# Patient Record
Sex: MALE | Race: WHITE | NOT HISPANIC OR LATINO
[De-identification: names, ages, dates, MRNs, and addresses within clinical notes are randomized per-mention and may not be internally consistent; named-entity substitution may affect disease eponyms.]

---

## 2022-12-01 ENCOUNTER — APPOINTMENT (OUTPATIENT)
Dept: ORTHOPEDIC SURGERY | Facility: CLINIC | Age: 57
End: 2022-12-01

## 2022-12-01 VITALS — BODY MASS INDEX: 32.47 KG/M2 | WEIGHT: 253 LBS | HEIGHT: 74 IN

## 2022-12-01 PROCEDURE — 73030 X-RAY EXAM OF SHOULDER: CPT | Mod: RT

## 2022-12-01 PROCEDURE — 72040 X-RAY EXAM NECK SPINE 2-3 VW: CPT

## 2022-12-01 PROCEDURE — 99213 OFFICE O/P EST LOW 20 MIN: CPT

## 2022-12-01 PROCEDURE — 73010 X-RAY EXAM OF SHOULDER BLADE: CPT | Mod: RT

## 2022-12-01 NOTE — IMAGING
[Facet arthropathy] : Facet arthropathy [Disc space narrowing] : Disc space narrowing [Right] : right shoulder [There are no fractures, subluxations or dislocations. No significant abnormalities are seen] : There are no fractures, subluxations or dislocations. No significant abnormalities are seen [de-identified] : RIGHT SHOULDER\par No swelling, no ecchymosis, no deformity, no scapular winging.\par TTP BRACHIALIS\par Forward flexion to 180; external rotation to 90 degrees (with shoulder abducted); internal rotation to 70 degrees (with shoulder abducted)\par 5/5 supraspinatus, infraspinatus and subscapularis\par Negative Vuong test, negative impingement sign, negative O’Ronald test.\par Speed’s and yergason negative\par Motor and sensory intact distally\par \par CERVICAL\par No swelling, no ecchymosis\par No trapezial or paracervical tenderness to palpation\par Full range of motion with no pain\par 5/5 deltoid, biceps, triceps and wrist flexors/extensors\par Negative spurling, negative retana\par Reflexes +2, intact motor and sensory distally\par

## 2022-12-01 NOTE — HISTORY OF PRESENT ILLNESS
[4] : 4 [Sharp] : sharp [Tingling] : tingling [Constant] : constant [de-identified] : 12/01/2022:  Mr. Sanderson IS A 57 year YEAR OLD male PRESENTS TODAY FOR RIGHT ARM. PAIN FOR THE PAST FEW WEEKS WITHOUT INJURY. FEELS PAIN UNDER THE ARM AS WELL AS NUMBNESS INTO THE WHOLE HAND. [] : no [FreeTextEntry1] : right arm [FreeTextEntry5] : no reported injury, pt started feeling pain in the arm about a month ago. He states he has been experiencing numbness in the fingers  [FreeTextEntry6] : numbness  [FreeTextEntry7] : into the fingers

## 2022-12-01 NOTE — ASSESSMENT
[FreeTextEntry1] : ATRAUMATIC RIGHT ARM PAIN WITH RADICULAR SYMPTOMS\par SHOULDER XRAYS NEGATIVE, CERVICAL XRAYS WITH MULTILEVEL DDD MOST NOTED AT C4-5 AND C5-6\par MDP RX\par BEGIN PT\par REFER TO SPINE IF SYMPTOMS PERSIST

## 2022-12-08 ENCOUNTER — APPOINTMENT (OUTPATIENT)
Dept: ORTHOPEDIC SURGERY | Facility: CLINIC | Age: 57
End: 2022-12-08

## 2022-12-08 VITALS — HEIGHT: 74 IN | BODY MASS INDEX: 32.34 KG/M2 | WEIGHT: 252 LBS

## 2022-12-08 DIAGNOSIS — I51.9 HEART DISEASE, UNSPECIFIED: ICD-10-CM

## 2022-12-08 PROCEDURE — 99213 OFFICE O/P EST LOW 20 MIN: CPT

## 2022-12-08 NOTE — HISTORY OF PRESENT ILLNESS
[Right Arm] : right arm [3] : 3 [Radiating] : radiating [Sharp] : sharp [Tingling] : tingling [Constant] : constant [Household chores] : household chores [Leisure] : leisure [Work] : work [Full time] : Work status: full time [de-identified] : 57 year old male with RUE radiculopathy.  Pain radiates to upper arm. Tingling digits 2-5.  No numbness, weakness or tingling.  No inctiing injury or event.  Never done PT.  NHad history JANET in lower back.  Tried  MDP finished yesterday with no change in symptoms.  .  NO JANET in neck.  No issues with balance, fine motor movement , or dexterity.\par \par \par PMH: None\par PSH: R distal biceps repair., L shoulder labrum repair.,  L knee meniscus repair\par All:NKDA [] : no [FreeTextEntry1] : right hand [FreeTextEntry5] : pt states he has right arm and right hand pain. Referred by Dr. Chow, was told the pain is coming from the cervical spine. no injury/ fall. pain has been for about 2-3 months.  [FreeTextEntry6] : numbness [FreeTextEntry9] : aleve [de-identified] : xray [de-identified] : [employer]

## 2022-12-08 NOTE — ASSESSMENT
[FreeTextEntry1] : 57 year old  [place] with RUE radiculopathy.  Failed MDP, and aleve. \par MRI C spine\par Naprosyn\par PT\par FU after MRI

## 2022-12-08 NOTE — IMAGING
[de-identified] : Spine:\par Inspection/Palpation:\par No tenderness to palpation throughout Cervical/thoracic/lumbar spine.\par No bony stepoffs, No lesions.\par \par Gait:\par Non-antalgic, able to perform bilateral toe and heel rise.  Able to perform tandem gait.  \par \par Range of Motion:\par Cervical Spine: Flexion to chin to chest, extension to 70 degrees,  rotation 90 degrees bilaterally, Lateral flexion to 45 degrees bilaterally\par \par \par Neurologic:\par Bilateral upper extremities 5/5 Deltoid/Biceps/Triceps/ Wrist Flexion/Wrist Extension/ / Intrinsics Except\par \par Sensation intact to light touch C5-T1\par \par Biceps/Triceps/Brachioradialis Reflex within normal limits\par \par Negative Cox's,  No inverted brachioradials reflex\par \par \par \par  X-ray Cervical spine with multilevel degenerative changes.  C3-4 spondylolisthesis.

## 2022-12-14 ENCOUNTER — FORM ENCOUNTER (OUTPATIENT)
Age: 57
End: 2022-12-14

## 2022-12-15 ENCOUNTER — APPOINTMENT (OUTPATIENT)
Dept: MRI IMAGING | Facility: CLINIC | Age: 57
End: 2022-12-15
Payer: COMMERCIAL

## 2022-12-15 PROCEDURE — 72141 MRI NECK SPINE W/O DYE: CPT

## 2022-12-22 ENCOUNTER — APPOINTMENT (OUTPATIENT)
Dept: ORTHOPEDIC SURGERY | Facility: CLINIC | Age: 57
End: 2022-12-22
Payer: COMMERCIAL

## 2022-12-22 VITALS — WEIGHT: 252 LBS | HEIGHT: 74 IN | BODY MASS INDEX: 32.34 KG/M2

## 2022-12-22 PROCEDURE — 99213 OFFICE O/P EST LOW 20 MIN: CPT

## 2022-12-22 NOTE — HISTORY OF PRESENT ILLNESS
[Right Arm] : right arm [3] : 3 [Radiating] : radiating [Sharp] : sharp [Tingling] : tingling [Constant] : constant [Household chores] : household chores [Leisure] : leisure [Work] : work [Full time] : Work status: full time [de-identified] : 57 year old male with RUE radiculopathy.  Pain radiates to upper arm. Tingling digits 2-5.  No numbness, weakness or tingling.  No inctiing injury or event.  Never done PT.  NHad history JANET in lower back.  Tried  MDP finished yesterday with no change in symptoms.  .  NO JANET in neck.  No issues with balance, fine motor movement , or dexterity.\par \par \par PMH: None\par PSH: R distal biceps repair., L shoulder labrum repair.,  L knee meniscus repair\par All:NKDA\par \par 12/22/22: Pt is here for MRI results, he reports the pain in his arm is subsiding, but the numbness and tingling in his hand is the same.  [] : no [FreeTextEntry1] : right hand [FreeTextEntry5] : pt states he has right arm and right hand pain. Referred by Dr. Chow, was told the pain is coming from the cervical spine. no injury/ fall. pain has been for about 2-3 months.  [FreeTextEntry6] : numbness [FreeTextEntry9] : aleve [de-identified] : xray [de-identified] : [employer]

## 2022-12-22 NOTE — ASSESSMENT
[FreeTextEntry1] : 57 year old  male with RUE radiculopathy.  Failed MDP, and aleve. \par MIld improvement in arm pain but with persistent tingling in C7 and C8 distribution\par Pain management referral for JANET\par FU after injections\par if injections fail discussed possible posterior cervical decompression and fusion

## 2022-12-22 NOTE — IMAGING
[de-identified] : Spine:\par Inspection/Palpation:\par No tenderness to palpation throughout Cervical/thoracic/lumbar spine.\par No bony stepoffs, No lesions.\par \par Gait:\par Non-antalgic, able to perform bilateral toe and heel rise.  Able to perform tandem gait.  \par \par Range of Motion:\par Cervical Spine: Flexion to chin to chest, extension to 70 degrees,  rotation 90 degrees bilaterally, Lateral flexion to 45 degrees bilaterally\par \par \par Neurologic:\par Bilateral upper extremities 5/5 Deltoid/Biceps/Triceps/ Wrist Flexion/Wrist Extension/ / Intrinsics Except\par \par Sensation intact to light touch C5-T1\par \par Biceps/Triceps/Brachioradialis Reflex within normal limits\par \par Negative Cox's,  No inverted brachioradials reflex\par \par \par \par  X-ray Cervical spine with multilevel degenerative changes.  C3-4 spondylolisthesis\par \par MRI C spine reviewed and interpreted independently,\par Multilevel degenerative changes with foraminal stenosis No fever, rash, or recent illness.  No joint pain/swelling/stiffness.  No eye pain/redness/change in vision.  No sores in the mouth or nose.  No difficulty swallowing.  No chest pain or shortness of breath.  No abdominal complaints or weight loss.  No weakness.  No headaches or focal neurological deficits.  No urinary changes.  No other new symptoms. spinal cord compression\par Suboptimal study secondary to patient motion.\par Reversal of cervical lordosis with minimal degenerative anterolisthesis at C3-4.\par Multilevel cervical degenerative disc disease most pronounced at C5-6.\par Disc herniations and/or osteophytes from C2-3 through C6-7 with spinal cord and/or nerve root compression as \par detailed above.\par The spinal cord is suboptimally evaluated because of the patient motion. As best as can be determined the spinal \par cord has a normal signal..

## 2022-12-29 ENCOUNTER — APPOINTMENT (OUTPATIENT)
Dept: PAIN MANAGEMENT | Facility: CLINIC | Age: 57
End: 2022-12-29

## 2022-12-29 VITALS — HEIGHT: 74 IN | WEIGHT: 252 LBS | BODY MASS INDEX: 32.34 KG/M2

## 2022-12-29 DIAGNOSIS — M54.12 RADICULOPATHY, CERVICAL REGION: ICD-10-CM

## 2022-12-29 DIAGNOSIS — M54.2 CERVICALGIA: ICD-10-CM

## 2022-12-29 PROCEDURE — 99204 OFFICE O/P NEW MOD 45 MIN: CPT

## 2022-12-29 NOTE — PHYSICAL EXAM
[de-identified] : Constitutional; Appears well, no apparent distress\par Ability to communicate: Normal \par Respiratory: non-labored breathing\par Skin: No rash noted\par Head: Normocephalic, atraumatic\par Neck: no visible thyroid enlargement\par Eyes: Extraocular movements intact\par Neurologic: Alert and oriented x3\par Psychiatric: normal mood, affect and behavior \par \par  [] : positive Spurling

## 2022-12-29 NOTE — DISCUSSION/SUMMARY
[de-identified] : After discussing various treatment options with the patient including but not limited to oral medications, physical therapy, exercise modalities as well as interventional spinal injections, we have decided with the following plan:\par \par - Continue Home exercises, stretching, activity modification, physical therapy, and conservative care.\par - MRI report and/or images was reviewed and discussed with the patient.\par - Recommend C7-T1 Cervical Epidural Steroid Injection under fluoroscopic guidance with image.\par - The risks, benefits and alternatives of the proposed procedure were explained in detail with the patient. The risks outlined include but are not limited to infection, bleeding, post-dural puncture headache, nerve injury, a temporary increase in pain, failure to resolve symptoms, allergic reaction, symptom recurrence, and possible elevation of blood sugar in diabetics. All questions were answered to patient's apparent satisfaction and he/she verbalized an understanding.\par - Patient is presenting with acute/sub-acute radicular pain with impairment in ADLs and functionality.  The pain has not responded to conservative care including NSAID therapy and/or physical therapy.  There is no bleeding tendency, unstable medical condition, or systemic infection.\par - Follow up in 1-2 weeks post injection for re-evaluation.\par - Recommend continue Naproxen 500mg BID PRN. Potential adverse effects including but not limited to bleeding, ulcers, increased risk of hypertension, heart disease, kidney disease and stroke were discussed with the patient.  Medication would allow patient to be more mobile and perform ADL's.  Will continue to monitor patient and attempt to wean as soon as possible. Will use the lowest dosage possible for the shortest possible period of time.\par

## 2022-12-29 NOTE — HISTORY OF PRESENT ILLNESS
[Neck] : neck [3] : 3 [Radiating] : radiating [Shooting] : shooting [Tingling] : tingling [Constant] : constant [Sleep] : sleep [Rest] : rest [Meds] : meds [FreeTextEntry1] : Initial HPI 12/29/2022:\par Pain started 3 months ago and is on the right side upper arm from the shoulder to the elbow described as a sharp pain with with associated numbness and tingling in the fingers. Arm pain is now resolved but still has some tingling/numbness in the right hand. Saw Dr. Rico Kennedy who recommended JANET. \par \par MRI Cervical Spine on 12/15/22: C4-5 HNP with C5 impingement; C5-6 with C6 impingement. \par Conservative Care: None\par Pain Medications: Naproxen PRN \par Past Injections: none\par Spine surgery: none \par Blood thinners: none\par  [] : no [FreeTextEntry6] : numbness  [FreeTextEntry7] : right side down to arm and fingers  [de-identified] : discomfort is always there

## 2023-01-19 ENCOUNTER — APPOINTMENT (OUTPATIENT)
Dept: PAIN MANAGEMENT | Facility: CLINIC | Age: 58
End: 2023-01-19

## 2023-02-02 ENCOUNTER — APPOINTMENT (OUTPATIENT)
Dept: PAIN MANAGEMENT | Facility: CLINIC | Age: 58
End: 2023-02-02

## 2023-02-03 ENCOUNTER — RX RENEWAL (OUTPATIENT)
Age: 58
End: 2023-02-03

## 2023-05-04 ENCOUNTER — OFFICE (OUTPATIENT)
Dept: URBAN - METROPOLITAN AREA CLINIC 35 | Facility: CLINIC | Age: 58
Setting detail: OPHTHALMOLOGY
End: 2023-05-04
Payer: COMMERCIAL

## 2023-05-04 DIAGNOSIS — H53.2: ICD-10-CM

## 2023-05-04 DIAGNOSIS — H49.21: ICD-10-CM

## 2023-05-04 PROCEDURE — 92060 SENSORIMOTOR EXAMINATION: CPT | Performed by: OPHTHALMOLOGY

## 2023-05-04 PROCEDURE — 92002 INTRM OPH EXAM NEW PATIENT: CPT | Performed by: OPHTHALMOLOGY

## 2023-05-04 ASSESSMENT — REFRACTION_MANIFEST
OD_VPRISM_DIRECTION: BO
OD_CYLINDER: SPHERE
OS_SPHERE: +2.00
OS_CYLINDER: SPHERE
OD_SPHERE: +2.00

## 2023-05-04 ASSESSMENT — REFRACTION_AUTOREFRACTION
OS_SPHERE: +2.00
OS_CYLINDER: +0.25
OD_CYLINDER: +0.50
OS_AXIS: 158
OD_AXIS: 014
OD_SPHERE: +2.25

## 2023-05-04 ASSESSMENT — CONFRONTATIONAL VISUAL FIELD TEST (CVF)
OS_FINDINGS: FULL
OD_FINDINGS: FULL

## 2023-05-04 ASSESSMENT — REFRACTION_CURRENTRX
OD_OVR_VA: 20/
OD_ADD: +2.00
OS_ADD: +2.00
OS_OVR_VA: 20/
OD_SPHERE: +2.00
OS_SPHERE: +1.50
OD_VPRISM_DIRECTION: PROGS
OS_VPRISM_DIRECTION: PROGS
OD_AXIS: 0
OD_CYLINDER: SPHERE
OS_AXIS: 0
OS_CYLINDER: SPHERE

## 2023-05-04 ASSESSMENT — AXIALLENGTH_DERIVED
OD_AL: 23.767
OS_AL: 23.9635

## 2023-05-04 ASSESSMENT — VISUAL ACUITY
OS_BCVA: 20/20
OD_BCVA: 20/20-

## 2023-05-04 ASSESSMENT — KERATOMETRY
OS_AXISANGLE_DEGREES: 098
OD_AXISANGLE_DEGREES: 072
OD_K2POWER_DIOPTERS: 40.50
OS_K2POWER_DIOPTERS: 40.50
OD_K1POWER_DIOPTERS: 40.25
OS_K1POWER_DIOPTERS: 40.00

## 2023-05-04 ASSESSMENT — TONOMETRY
OD_IOP_MMHG: 13
OS_IOP_MMHG: 13

## 2023-05-04 ASSESSMENT — SPHEQUIV_DERIVED
OD_SPHEQUIV: 2.5
OS_SPHEQUIV: 2.125

## 2023-05-09 ENCOUNTER — TRANSCRIPTION ENCOUNTER (OUTPATIENT)
Age: 58
End: 2023-05-09

## 2023-05-09 ENCOUNTER — APPOINTMENT (OUTPATIENT)
Dept: INTERNAL MEDICINE | Facility: CLINIC | Age: 58
End: 2023-05-09
Payer: COMMERCIAL

## 2023-05-09 ENCOUNTER — NON-APPOINTMENT (OUTPATIENT)
Age: 58
End: 2023-05-09

## 2023-05-09 VITALS
HEART RATE: 76 BPM | SYSTOLIC BLOOD PRESSURE: 149 MMHG | OXYGEN SATURATION: 96 % | TEMPERATURE: 97.6 F | WEIGHT: 257 LBS | HEIGHT: 74 IN | DIASTOLIC BLOOD PRESSURE: 91 MMHG | BODY MASS INDEX: 32.98 KG/M2

## 2023-05-09 VITALS — SYSTOLIC BLOOD PRESSURE: 138 MMHG | DIASTOLIC BLOOD PRESSURE: 82 MMHG

## 2023-05-09 PROCEDURE — 36415 COLL VENOUS BLD VENIPUNCTURE: CPT

## 2023-05-09 PROCEDURE — 93000 ELECTROCARDIOGRAM COMPLETE: CPT

## 2023-05-09 PROCEDURE — 99386 PREV VISIT NEW AGE 40-64: CPT | Mod: 25

## 2023-05-09 NOTE — HEALTH RISK ASSESSMENT
[Good] : ~his/her~  mood as  good [Yes] : Yes [1 or 2 (0 pts)] : 1 or 2 (0 points) [Never (0 pts)] : Never (0 points) [With Significant Other] : lives with significant other [With Family] : lives with family [Employed] : employed [] :  [# Of Children ___] : has [unfilled] children [Former] : Former [20 or more] : 20 or more [> 15 Years] : > 15 Years [Patient reported colonoscopy was normal] : Patient reported colonoscopy was normal [ColonoscopyComments] : last year normal  [de-identified] : works for [employer]

## 2023-05-09 NOTE — ADDENDUM
[FreeTextEntry1] : blood and urine obtained\par ekg done\par follow up neuro\par cardiology referall\par monitor bp at home

## 2023-05-09 NOTE — PHYSICAL EXAM
[No Acute Distress] : no acute distress [Well Nourished] : well nourished [Well Developed] : well developed [Well-Appearing] : well-appearing [Normal Sclera/Conjunctiva] : normal sclera/conjunctiva [PERRL] : pupils equal round and reactive to light [Normal Outer Ear/Nose] : the outer ears and nose were normal in appearance [Normal Oropharynx] : the oropharynx was normal [No JVD] : no jugular venous distention [No Lymphadenopathy] : no lymphadenopathy [Supple] : supple [Thyroid Normal, No Nodules] : the thyroid was normal and there were no nodules present [No Respiratory Distress] : no respiratory distress  [No Accessory Muscle Use] : no accessory muscle use [Clear to Auscultation] : lungs were clear to auscultation bilaterally [Normal Rate] : normal rate  [Regular Rhythm] : with a regular rhythm [Normal S1, S2] : normal S1 and S2 [No Murmur] : no murmur heard [No Carotid Bruits] : no carotid bruits [No Abdominal Bruit] : a ~M bruit was not heard ~T in the abdomen [No Varicosities] : no varicosities [Pedal Pulses Present] : the pedal pulses are present [No Edema] : there was no peripheral edema [No Palpable Aorta] : no palpable aorta [No Extremity Clubbing/Cyanosis] : no extremity clubbing/cyanosis [Soft] : abdomen soft [Non Tender] : non-tender [Non-distended] : non-distended [No Masses] : no abdominal mass palpated [No HSM] : no HSM [Normal Bowel Sounds] : normal bowel sounds [Normal Posterior Cervical Nodes] : no posterior cervical lymphadenopathy [Normal Anterior Cervical Nodes] : no anterior cervical lymphadenopathy [No CVA Tenderness] : no CVA  tenderness [No Spinal Tenderness] : no spinal tenderness [No Joint Swelling] : no joint swelling [Grossly Normal Strength/Tone] : grossly normal strength/tone [No Rash] : no rash [Coordination Grossly Intact] : coordination grossly intact [No Focal Deficits] : no focal deficits [Normal Gait] : normal gait [Deep Tendon Reflexes (DTR)] : deep tendon reflexes were 2+ and symmetric [Normal Affect] : the affect was normal [Normal Insight/Judgement] : insight and judgment were intact [de-identified] : right eye diplopia

## 2023-05-09 NOTE — HISTORY OF PRESENT ILLNESS
[FreeTextEntry1] : annual  [de-identified] : 58 year old male here for annual and blood work. Dr. 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 Patient states he went to go pick his son up hwen he started to see double out of the blue. still suffering with diplopia. Patient has been wearing an eye patch which has helped the vision and following up with opto on thursday. Patient has been undergoing stress at work. Denies any medications. Patient states he has issues with the muscles in the abdomen states he feels " the muscles pulling" when working out. patient denies any chest pain no sob.

## 2023-05-10 LAB
25(OH)D3 SERPL-MCNC: 31.4 NG/ML
ALBUMIN SERPL ELPH-MCNC: 4.7 G/DL
ALP BLD-CCNC: 59 U/L
ALT SERPL-CCNC: 35 U/L
ANION GAP SERPL CALC-SCNC: 12 MMOL/L
APPEARANCE: CLEAR
AST SERPL-CCNC: 18 U/L
BASOPHILS # BLD AUTO: 0.06 K/UL
BASOPHILS NFR BLD AUTO: 0.7 %
BILIRUB SERPL-MCNC: 0.4 MG/DL
BILIRUBIN URINE: NEGATIVE
BLOOD URINE: NEGATIVE
BUN SERPL-MCNC: 18 MG/DL
CALCIUM SERPL-MCNC: 9.2 MG/DL
CHLORIDE SERPL-SCNC: 106 MMOL/L
CHOLEST SERPL-MCNC: 198 MG/DL
CO2 SERPL-SCNC: 22 MMOL/L
COLOR: YELLOW
CREAT SERPL-MCNC: 1.16 MG/DL
EGFR: 73 ML/MIN/1.73M2
EOSINOPHIL # BLD AUTO: 0.29 K/UL
EOSINOPHIL NFR BLD AUTO: 3.2 %
ESTIMATED AVERAGE GLUCOSE: 126 MG/DL
FOLATE SERPL-MCNC: >20 NG/ML
GLUCOSE QUALITATIVE U: NEGATIVE MG/DL
GLUCOSE SERPL-MCNC: 102 MG/DL
HBA1C MFR BLD HPLC: 6 %
HCT VFR BLD CALC: 48.2 %
HDLC SERPL-MCNC: 42 MG/DL
HGB BLD-MCNC: 16.1 G/DL
IMM GRANULOCYTES NFR BLD AUTO: 0.3 %
KETONES URINE: NEGATIVE MG/DL
LDLC SERPL CALC-MCNC: 118 MG/DL
LEUKOCYTE ESTERASE URINE: NEGATIVE
LYMPHOCYTES # BLD AUTO: 2.78 K/UL
LYMPHOCYTES NFR BLD AUTO: 30.5 %
MAGNESIUM SERPL-MCNC: 2 MG/DL
MAN DIFF?: NORMAL
MCHC RBC-ENTMCNC: 28 PG
MCHC RBC-ENTMCNC: 33.4 GM/DL
MCV RBC AUTO: 83.8 FL
MONOCYTES # BLD AUTO: 0.55 K/UL
MONOCYTES NFR BLD AUTO: 6 %
NEUTROPHILS # BLD AUTO: 5.41 K/UL
NEUTROPHILS NFR BLD AUTO: 59.3 %
NITRITE URINE: NEGATIVE
NONHDLC SERPL-MCNC: 155 MG/DL
PH URINE: 5.5
PLATELET # BLD AUTO: 265 K/UL
POTASSIUM SERPL-SCNC: 4.4 MMOL/L
PROT SERPL-MCNC: 7 G/DL
PROTEIN URINE: NEGATIVE MG/DL
PSA SERPL-MCNC: 0.4 NG/ML
RBC # BLD: 5.75 M/UL
RBC # FLD: 13.1 %
SODIUM SERPL-SCNC: 140 MMOL/L
SPECIFIC GRAVITY URINE: 1.02
TRIGL SERPL-MCNC: 186 MG/DL
TSH SERPL-ACNC: 2.35 UIU/ML
UROBILINOGEN URINE: 0.2 MG/DL
VIT B12 SERPL-MCNC: 391 PG/ML
WBC # FLD AUTO: 9.12 K/UL

## 2023-05-11 ENCOUNTER — OFFICE (OUTPATIENT)
Dept: URBAN - METROPOLITAN AREA CLINIC 35 | Facility: CLINIC | Age: 58
Setting detail: OPHTHALMOLOGY
End: 2023-05-11
Payer: COMMERCIAL

## 2023-05-11 ENCOUNTER — TRANSCRIPTION ENCOUNTER (OUTPATIENT)
Age: 58
End: 2023-05-11

## 2023-05-11 DIAGNOSIS — H49.21: ICD-10-CM

## 2023-05-11 DIAGNOSIS — H53.2: ICD-10-CM

## 2023-05-11 PROBLEM — H52.7 REFRACTIVE ERROR: Status: ACTIVE | Noted: 2023-05-04

## 2023-05-11 PROCEDURE — 99213 OFFICE O/P EST LOW 20 MIN: CPT | Performed by: OPHTHALMOLOGY

## 2023-05-11 ASSESSMENT — REFRACTION_CURRENTRX
OD_CYLINDER: SPHERE
OS_CYLINDER: SPHERE
OS_VPRISM_DIRECTION: PROGS
OD_SPHERE: +2.00
OS_OVR_VA: 20/
OS_SPHERE: +1.50
OD_ADD: +2.00
OD_AXIS: 0
OS_ADD: +2.00
OS_AXIS: 0
OD_VPRISM_DIRECTION: PROGS
OD_OVR_VA: 20/

## 2023-05-11 ASSESSMENT — VISUAL ACUITY
OD_BCVA: 20/20
OS_BCVA: 20/20-

## 2023-05-11 ASSESSMENT — REFRACTION_AUTOREFRACTION
OD_AXIS: 001
OD_SPHERE: +2.25
OD_CYLINDER: +0.25
OS_AXIS: 159
OS_SPHERE: +2.00
OS_CYLINDER: +0.25

## 2023-05-11 ASSESSMENT — AXIALLENGTH_DERIVED
OD_AL: 23.9105
OS_AL: 23.9635

## 2023-05-11 ASSESSMENT — REFRACTION_MANIFEST
OS_CYLINDER: SPHERE
OD_CYLINDER: SPHERE
OD_VPRISM_DIRECTION: BO
OS_SPHERE: +2.00
OD_SPHERE: +2.00

## 2023-05-11 ASSESSMENT — SPHEQUIV_DERIVED
OS_SPHEQUIV: 2.125
OD_SPHEQUIV: 2.375

## 2023-05-11 ASSESSMENT — KERATOMETRY
OS_K2POWER_DIOPTERS: 40.50
OS_AXISANGLE_DEGREES: 092
OS_K1POWER_DIOPTERS: 40.00
OD_K2POWER_DIOPTERS: 40.25
OD_AXISANGLE_DEGREES: 073
OD_K1POWER_DIOPTERS: 40.00

## 2023-05-11 ASSESSMENT — CONFRONTATIONAL VISUAL FIELD TEST (CVF)
OD_FINDINGS: FULL
OS_FINDINGS: FULL

## 2024-03-01 ENCOUNTER — APPOINTMENT (OUTPATIENT)
Dept: INTERNAL MEDICINE | Facility: CLINIC | Age: 59
End: 2024-03-01
Payer: COMMERCIAL

## 2024-03-01 VITALS
DIASTOLIC BLOOD PRESSURE: 62 MMHG | OXYGEN SATURATION: 94 % | HEART RATE: 101 BPM | WEIGHT: 257 LBS | HEIGHT: 74 IN | TEMPERATURE: 102.4 F | SYSTOLIC BLOOD PRESSURE: 112 MMHG | BODY MASS INDEX: 32.98 KG/M2

## 2024-03-01 DIAGNOSIS — R68.89 OTHER GENERAL SYMPTOMS AND SIGNS: ICD-10-CM

## 2024-03-01 DIAGNOSIS — J10.1 INFLUENZA DUE TO OTHER IDENTIFIED INFLUENZA VIRUS WITH OTHER RESPIRATORY MANIFESTATIONS: ICD-10-CM

## 2024-03-01 LAB — INFLUENZA A RESULT: POSITIVE

## 2024-03-01 PROCEDURE — 99214 OFFICE O/P EST MOD 30 MIN: CPT

## 2024-03-01 RX ORDER — NAPROXEN 500 MG/1
500 TABLET ORAL
Qty: 60 | Refills: 1 | Status: COMPLETED | COMMUNITY
Start: 2022-12-08 | End: 2024-03-01

## 2024-03-01 RX ORDER — OSELTAMIVIR PHOSPHATE 75 MG/1
75 CAPSULE ORAL TWICE DAILY
Qty: 1 | Refills: 0 | Status: ACTIVE | COMMUNITY
Start: 2024-03-01 | End: 1900-01-01

## 2024-03-01 RX ORDER — METHYLPREDNISOLONE 4 MG/1
4 TABLET ORAL
Qty: 1 | Refills: 0 | Status: COMPLETED | COMMUNITY
Start: 2022-12-01 | End: 2024-03-01

## 2024-03-01 NOTE — HEALTH RISK ASSESSMENT
[No] : No [No falls in past year] : Patient reported no falls in the past year [0] : 2) Feeling down, depressed, or hopeless: Not at all (0) [PHQ-2 Negative - No further assessment needed] : PHQ-2 Negative - No further assessment needed [Never] : Never [[id number]] : 0

## 2024-03-01 NOTE — ASSESSMENT
[FreeTextEntry1] : 58 M + Influenza A tamiflu bid for 5 days Tylenol or Ibuprofen is recommended for treatment of fevers, chills, body-aches Over the counter decongestants and cough suppressants for mild nasal congestion, rhinorrhea and cough Lozenges for sore throat Keep well hydrated Rest rto if no improvement pt agrees and understands plan via teach back method. all questions answered.

## 2024-03-01 NOTE — HISTORY OF PRESENT ILLNESS
[FreeTextEntry8] : 58 M + exposure to flu son + flu  sx started yesterday < 24 hours + Rhinitis, dry cough  Denies Sinus congestion, Denies Chest Congestion Sore Throat + fever,  + body aches  denies earache, dizziness, tinnitus or hearing loss. Denies SOB or wheezing or CP or palpitations Denies NVD, abdominal pain Denies HA + covid vaccinated denies any recent travel +  recent sick contacts denies any loss of taste or smell. pt able to tolerate po take otc nyquil dayquil

## 2024-03-01 NOTE — PHYSICAL EXAM
[No Acute Distress] : no acute distress [Well Nourished] : well nourished [Normal Sclera/Conjunctiva] : normal sclera/conjunctiva [PERRL] : pupils equal round and reactive to light [EOMI] : extraocular movements intact [Normal Oropharynx] : the oropharynx was normal [No JVD] : no jugular venous distention [No Lymphadenopathy] : no lymphadenopathy [Supple] : supple [No Respiratory Distress] : no respiratory distress  [No Accessory Muscle Use] : no accessory muscle use [Clear to Auscultation] : lungs were clear to auscultation bilaterally [Normal Rate] : normal rate  [Regular Rhythm] : with a regular rhythm [Normal S1, S2] : normal S1 and S2 [Normal Posterior Cervical Nodes] : no posterior cervical lymphadenopathy [Normal Anterior Cervical Nodes] : no anterior cervical lymphadenopathy [No CVA Tenderness] : no CVA  tenderness [No Spinal Tenderness] : no spinal tenderness [No Joint Swelling] : no joint swelling [Grossly Normal Strength/Tone] : grossly normal strength/tone [No Rash] : no rash [Coordination Grossly Intact] : coordination grossly intact [No Focal Deficits] : no focal deficits [Normal Gait] : normal gait [Deep Tendon Reflexes (DTR)] : deep tendon reflexes were 2+ and symmetric [Normal Affect] : the affect was normal [Alert and Oriented x3] : oriented to person, place, and time [Normal Insight/Judgement] : insight and judgment were intact [Well Developed] : well developed [de-identified] : mood stable denies si/hi

## 2024-03-01 NOTE — REVIEW OF SYSTEMS
[Negative] : Heme/Lymph [Fever] : fever [Chills] : chills [Fatigue] : fatigue [Postnasal Drip] : postnasal drip [Nasal Discharge] : nasal discharge [Cough] : cough [Night Sweats] : no night sweats [Hot Flashes] : no hot flashes [Earache] : no earache [Recent Change In Weight] : ~T no recent weight change [Nosebleeds] : no nosebleeds [Hearing Loss] : no hearing loss [Sore Throat] : no sore throat [Hoarseness] : no hoarseness [Shortness Of Breath] : no shortness of breath [Wheezing] : no wheezing [Dyspnea on Exertion] : not dyspnea on exertion [FreeTextEntry6] : dry non productive cough

## 2024-05-10 ENCOUNTER — NON-APPOINTMENT (OUTPATIENT)
Age: 59
End: 2024-05-10

## 2024-05-10 ENCOUNTER — APPOINTMENT (OUTPATIENT)
Dept: INTERNAL MEDICINE | Facility: CLINIC | Age: 59
End: 2024-05-10
Payer: COMMERCIAL

## 2024-05-10 VITALS — HEART RATE: 60 BPM | WEIGHT: 257 LBS | HEIGHT: 73.03 IN | BODY MASS INDEX: 34.06 KG/M2 | OXYGEN SATURATION: 94 %

## 2024-05-10 VITALS — SYSTOLIC BLOOD PRESSURE: 120 MMHG | DIASTOLIC BLOOD PRESSURE: 86 MMHG

## 2024-05-10 DIAGNOSIS — Z00.00 ENCOUNTER FOR GENERAL ADULT MEDICAL EXAMINATION W/OUT ABNORMAL FINDINGS: ICD-10-CM

## 2024-05-10 DIAGNOSIS — K42.9 UMBILICAL HERNIA W/OUT OBSTRUCTION OR GANGRENE: ICD-10-CM

## 2024-05-10 PROCEDURE — 36415 COLL VENOUS BLD VENIPUNCTURE: CPT

## 2024-05-10 PROCEDURE — 93000 ELECTROCARDIOGRAM COMPLETE: CPT

## 2024-05-10 PROCEDURE — 99396 PREV VISIT EST AGE 40-64: CPT | Mod: 25

## 2024-05-10 NOTE — HISTORY OF PRESENT ILLNESS
[FreeTextEntry1] : annual  [de-identified] : 59 year old male here for annual well visit. patient started to go back to the gym trying to lose weight discussed possibly starting weight loss medications. patient has strong familial cardiac hx sending to cardiologist. patient states abdominal hernia for a few years does not cause any pain or affect anything. no chest pains no sob.

## 2024-05-10 NOTE — HEALTH RISK ASSESSMENT
[Good] : ~his/her~  mood as  good [Yes] : Yes [Never] : Never [Patient reported colonoscopy was normal] : Patient reported colonoscopy was normal [With Family] : lives with family [Employed] : employed [] :  [# Of Children ___] : has [unfilled] children [de-identified] : [employer]

## 2024-05-13 LAB
25(OH)D3 SERPL-MCNC: 22.4 NG/ML
ALBUMIN SERPL ELPH-MCNC: 4.5 G/DL
ALP BLD-CCNC: 62 U/L
ALT SERPL-CCNC: 36 U/L
ANION GAP SERPL CALC-SCNC: 10 MMOL/L
APPEARANCE: CLEAR
AST SERPL-CCNC: 20 U/L
BASOPHILS # BLD AUTO: 0.05 K/UL
BASOPHILS NFR BLD AUTO: 0.6 %
BILIRUB SERPL-MCNC: 0.5 MG/DL
BILIRUBIN URINE: NEGATIVE
BLOOD URINE: NEGATIVE
BUN SERPL-MCNC: 15 MG/DL
CALCIUM SERPL-MCNC: 9.4 MG/DL
CHLORIDE SERPL-SCNC: 104 MMOL/L
CHOLEST SERPL-MCNC: 192 MG/DL
CO2 SERPL-SCNC: 24 MMOL/L
COLOR: YELLOW
CREAT SERPL-MCNC: 1.18 MG/DL
EGFR: 72 ML/MIN/1.73M2
EOSINOPHIL # BLD AUTO: 0.32 K/UL
EOSINOPHIL NFR BLD AUTO: 3.9 %
ESTIMATED AVERAGE GLUCOSE: 114 MG/DL
FOLATE SERPL-MCNC: 15.8 NG/ML
GLUCOSE QUALITATIVE U: NEGATIVE MG/DL
GLUCOSE SERPL-MCNC: 110 MG/DL
HBA1C MFR BLD HPLC: 5.6 %
HCT VFR BLD CALC: 48.7 %
HDLC SERPL-MCNC: 42 MG/DL
HGB BLD-MCNC: 16 G/DL
IMM GRANULOCYTES NFR BLD AUTO: 0.5 %
KETONES URINE: NEGATIVE MG/DL
LDLC SERPL CALC-MCNC: 124 MG/DL
LEUKOCYTE ESTERASE URINE: NEGATIVE
LYMPHOCYTES # BLD AUTO: 2.66 K/UL
LYMPHOCYTES NFR BLD AUTO: 32.3 %
MAGNESIUM SERPL-MCNC: 2.2 MG/DL
MAN DIFF?: NORMAL
MCHC RBC-ENTMCNC: 28 PG
MCHC RBC-ENTMCNC: 32.9 GM/DL
MCV RBC AUTO: 85.1 FL
MONOCYTES # BLD AUTO: 0.56 K/UL
MONOCYTES NFR BLD AUTO: 6.8 %
NEUTROPHILS # BLD AUTO: 4.6 K/UL
NEUTROPHILS NFR BLD AUTO: 55.9 %
NITRITE URINE: NEGATIVE
NONHDLC SERPL-MCNC: 150 MG/DL
PH URINE: 6
PLATELET # BLD AUTO: 271 K/UL
POTASSIUM SERPL-SCNC: 4.6 MMOL/L
PROT SERPL-MCNC: 6.5 G/DL
PROTEIN URINE: NEGATIVE MG/DL
PSA SERPL-MCNC: 0.5 NG/ML
RBC # BLD: 5.72 M/UL
RBC # FLD: 13.5 %
SODIUM SERPL-SCNC: 138 MMOL/L
SPECIFIC GRAVITY URINE: 1.02
TRIGL SERPL-MCNC: 150 MG/DL
TSH SERPL-ACNC: 2.01 UIU/ML
UROBILINOGEN URINE: 0.2 MG/DL
VIT B12 SERPL-MCNC: 347 PG/ML
WBC # FLD AUTO: 8.23 K/UL

## 2025-07-22 ENCOUNTER — NON-APPOINTMENT (OUTPATIENT)
Age: 60
End: 2025-07-22

## 2025-07-24 ENCOUNTER — APPOINTMENT (OUTPATIENT)
Dept: INTERNAL MEDICINE | Facility: CLINIC | Age: 60
End: 2025-07-24
Payer: COMMERCIAL

## 2025-07-24 VITALS
HEIGHT: 73 IN | OXYGEN SATURATION: 95 % | WEIGHT: 257 LBS | TEMPERATURE: 97.7 F | SYSTOLIC BLOOD PRESSURE: 124 MMHG | HEART RATE: 60 BPM | BODY MASS INDEX: 34.06 KG/M2 | DIASTOLIC BLOOD PRESSURE: 86 MMHG

## 2025-07-24 DIAGNOSIS — R10.811 RIGHT UPPER QUADRANT ABDOMINAL TENDERNESS: ICD-10-CM

## 2025-07-24 DIAGNOSIS — Z00.00 ENCOUNTER FOR GENERAL ADULT MEDICAL EXAMINATION W/OUT ABNORMAL FINDINGS: ICD-10-CM

## 2025-07-24 PROCEDURE — 36415 COLL VENOUS BLD VENIPUNCTURE: CPT

## 2025-07-24 PROCEDURE — 93000 ELECTROCARDIOGRAM COMPLETE: CPT

## 2025-07-24 PROCEDURE — 99396 PREV VISIT EST AGE 40-64: CPT

## 2025-07-25 ENCOUNTER — APPOINTMENT (OUTPATIENT)
Dept: CARDIOLOGY | Facility: CLINIC | Age: 60
End: 2025-07-25

## 2025-07-26 LAB
ALBUMIN SERPL ELPH-MCNC: 4.7 G/DL
ALP BLD-CCNC: 66 U/L
ALT SERPL-CCNC: 46 U/L
ANION GAP SERPL CALC-SCNC: 15 MMOL/L
APPEARANCE: CLEAR
AST SERPL-CCNC: 23 U/L
BASOPHILS # BLD AUTO: 0.09 K/UL
BASOPHILS NFR BLD AUTO: 1.1 %
BILIRUB SERPL-MCNC: 0.4 MG/DL
BILIRUBIN URINE: NEGATIVE
BLOOD URINE: NEGATIVE
BUN SERPL-MCNC: 16 MG/DL
CALCIUM SERPL-MCNC: 9.6 MG/DL
CHLORIDE SERPL-SCNC: 107 MMOL/L
CHOLEST SERPL-MCNC: 195 MG/DL
CO2 SERPL-SCNC: 18 MMOL/L
COLOR: YELLOW
CREAT SERPL-MCNC: 1.1 MG/DL
EGFRCR SERPLBLD CKD-EPI 2021: 77 ML/MIN/1.73M2
EOSINOPHIL # BLD AUTO: 0.43 K/UL
EOSINOPHIL NFR BLD AUTO: 5 %
GLUCOSE QUALITATIVE U: NEGATIVE MG/DL
GLUCOSE SERPL-MCNC: 110 MG/DL
HCT VFR BLD CALC: 49.4 %
HDLC SERPL-MCNC: 43 MG/DL
HGB BLD-MCNC: 16.2 G/DL
IMM GRANULOCYTES NFR BLD AUTO: 0.4 %
KETONES URINE: NEGATIVE MG/DL
LDLC SERPL-MCNC: 125 MG/DL
LEUKOCYTE ESTERASE URINE: NEGATIVE
LYMPHOCYTES # BLD AUTO: 2.76 K/UL
LYMPHOCYTES NFR BLD AUTO: 32.2 %
MAN DIFF?: NORMAL
MCHC RBC-ENTMCNC: 28 PG
MCHC RBC-ENTMCNC: 32.8 G/DL
MCV RBC AUTO: 85.3 FL
MONOCYTES # BLD AUTO: 0.6 K/UL
MONOCYTES NFR BLD AUTO: 7 %
NEUTROPHILS # BLD AUTO: 4.65 K/UL
NEUTROPHILS NFR BLD AUTO: 54.3 %
NITRITE URINE: NEGATIVE
NONHDLC SERPL-MCNC: 151 MG/DL
PH URINE: 6
PLATELET # BLD AUTO: 275 K/UL
POTASSIUM SERPL-SCNC: 4.7 MMOL/L
PROT SERPL-MCNC: 6.8 G/DL
PROTEIN URINE: NEGATIVE MG/DL
PSA SERPL-MCNC: 0.47 NG/ML
RBC # BLD: 5.79 M/UL
RBC # FLD: 13.4 %
SODIUM SERPL-SCNC: 139 MMOL/L
SPECIFIC GRAVITY URINE: 1.02
TRIGL SERPL-MCNC: 149 MG/DL
UROBILINOGEN URINE: 0.2 MG/DL
WBC # FLD AUTO: 8.56 K/UL

## 2025-07-29 LAB
ESTIMATED AVERAGE GLUCOSE: 128 MG/DL
HBA1C MFR BLD HPLC: 6.1 %

## 2025-08-21 ENCOUNTER — APPOINTMENT (OUTPATIENT)
Dept: CARDIOLOGY | Facility: CLINIC | Age: 60
End: 2025-08-21
Payer: COMMERCIAL

## 2025-08-21 VITALS
WEIGHT: 256 LBS | BODY MASS INDEX: 33.93 KG/M2 | RESPIRATION RATE: 16 BRPM | SYSTOLIC BLOOD PRESSURE: 119 MMHG | HEIGHT: 73 IN | HEART RATE: 73 BPM | OXYGEN SATURATION: 96 % | TEMPERATURE: 98 F | DIASTOLIC BLOOD PRESSURE: 76 MMHG

## 2025-08-21 DIAGNOSIS — E78.5 HYPERLIPIDEMIA, UNSPECIFIED: ICD-10-CM

## 2025-08-21 DIAGNOSIS — Z82.49 FAMILY HISTORY OF ISCHEMIC HEART DISEASE AND OTHER DISEASES OF THE CIRCULATORY SYSTEM: ICD-10-CM

## 2025-08-21 DIAGNOSIS — Z87.891 PERSONAL HISTORY OF NICOTINE DEPENDENCE: ICD-10-CM

## 2025-08-21 PROCEDURE — G2211 COMPLEX E/M VISIT ADD ON: CPT | Mod: NC

## 2025-08-21 PROCEDURE — 99204 OFFICE O/P NEW MOD 45 MIN: CPT

## 2025-08-21 RX ORDER — ATORVASTATIN CALCIUM 20 MG/1
20 TABLET, FILM COATED ORAL DAILY
Qty: 90 | Refills: 2 | Status: ACTIVE | COMMUNITY
Start: 2025-08-21 | End: 1900-01-01

## 2025-08-22 PROBLEM — Z87.891 FORMER SMOKER: Status: ACTIVE | Noted: 2025-08-21
